# Patient Record
Sex: MALE | Race: BLACK OR AFRICAN AMERICAN | NOT HISPANIC OR LATINO | URBAN - METROPOLITAN AREA
[De-identification: names, ages, dates, MRNs, and addresses within clinical notes are randomized per-mention and may not be internally consistent; named-entity substitution may affect disease eponyms.]

---

## 2019-06-14 ENCOUNTER — OFFICE VISIT (OUTPATIENT)
Dept: CARDIOLOGY | Facility: CLINIC | Age: 74
End: 2019-06-14
Payer: MEDICARE

## 2019-06-14 VITALS
HEIGHT: 68 IN | DIASTOLIC BLOOD PRESSURE: 96 MMHG | WEIGHT: 166 LBS | HEART RATE: 90 BPM | BODY MASS INDEX: 25.16 KG/M2 | SYSTOLIC BLOOD PRESSURE: 165 MMHG

## 2019-06-14 DIAGNOSIS — I10 ESSENTIAL HYPERTENSION: ICD-10-CM

## 2019-06-14 DIAGNOSIS — R94.31 NONSPECIFIC ABNORMAL ELECTROCARDIOGRAM (ECG) (EKG): Primary | ICD-10-CM

## 2019-06-14 DIAGNOSIS — I73.9 PAD (PERIPHERAL ARTERY DISEASE): ICD-10-CM

## 2019-06-14 DIAGNOSIS — R94.31 NONSPECIFIC ABNORMAL ELECTROCARDIOGRAM (ECG) (EKG): ICD-10-CM

## 2019-06-14 DIAGNOSIS — I43 CARDIOMYOPATHY IN DISEASES CLASSIFIED ELSEWHERE: ICD-10-CM

## 2019-06-14 DIAGNOSIS — I42.6 ALCOHOLIC CARDIOMYOPATHY: Primary | ICD-10-CM

## 2019-06-14 DIAGNOSIS — E78.00 PURE HYPERCHOLESTEROLEMIA: ICD-10-CM

## 2019-06-14 DIAGNOSIS — I69.359 HEMIPARESIS DUE TO OLD STROKE: ICD-10-CM

## 2019-06-14 PROCEDURE — 99205 OFFICE O/P NEW HI 60 MIN: CPT | Mod: S$GLB,,, | Performed by: INTERNAL MEDICINE

## 2019-06-14 PROCEDURE — 99205 PR OFFICE/OUTPT VISIT, NEW, LEVL V, 60-74 MIN: ICD-10-PCS | Mod: S$GLB,,, | Performed by: INTERNAL MEDICINE

## 2019-06-14 RX ORDER — LISINOPRIL 10 MG/1
TABLET ORAL
Refills: 2 | COMMUNITY
Start: 2019-05-30 | End: 2019-06-14 | Stop reason: DRUGHIGH

## 2019-06-14 RX ORDER — LISINOPRIL 20 MG/1
20 TABLET ORAL DAILY
Qty: 90 TABLET | Refills: 3 | Status: SHIPPED | OUTPATIENT
Start: 2019-06-14 | End: 2019-07-19 | Stop reason: DRUGHIGH

## 2019-06-14 RX ORDER — ASPIRIN 81 MG/1
81 TABLET ORAL DAILY
COMMUNITY

## 2019-06-14 RX ORDER — LUBIPROSTONE 8 UG/1
CAPSULE, GELATIN COATED ORAL
Refills: 2 | COMMUNITY
Start: 2019-05-30

## 2019-06-14 RX ORDER — SIMVASTATIN 10 MG/1
TABLET, FILM COATED ORAL
Refills: 0 | COMMUNITY
Start: 2019-05-30 | End: 2020-03-04 | Stop reason: SDUPTHER

## 2019-06-14 RX ORDER — LACTULOSE 10 G/15ML
SOLUTION ORAL; RECTAL
Refills: 2 | COMMUNITY
Start: 2019-05-30

## 2019-06-14 NOTE — PROGRESS NOTES
Subjective:      Patient ID: Alec Teague is a 73 y.o. male.    Chief Complaint: No chief complaint on file.    HPI:    ROS     Past Medical History:   Diagnosis Date    Cardiomyopathy     attributed to alcohol 1980's    CHF (congestive heart failure)     Hyperlipidemia     Hypertension     Stroke     residual right hemiparesis. 2008        No past surgical history on file.    Family History   Problem Relation Age of Onset    Pancreatic cancer Mother     Cirrhosis Brother     Asthma Brother        Social History     Socioeconomic History    Marital status:      Spouse name: Not on file    Number of children: Not on file    Years of education: Not on file    Highest education level: Not on file   Occupational History    Not on file   Social Needs    Financial resource strain: Not on file    Food insecurity:     Worry: Not on file     Inability: Not on file    Transportation needs:     Medical: Not on file     Non-medical: Not on file   Tobacco Use    Smoking status: Current Every Day Smoker     Types: Pipe    Smokeless tobacco: Never Used   Substance and Sexual Activity    Alcohol use: Not Currently    Drug use: Not on file    Sexual activity: Not on file   Lifestyle    Physical activity:     Days per week: Not on file     Minutes per session: Not on file    Stress: Not on file   Relationships    Social connections:     Talks on phone: Not on file     Gets together: Not on file     Attends Nondenominational service: Not on file     Active member of club or organization: Not on file     Attends meetings of clubs or organizations: Not on file     Relationship status: Not on file   Other Topics Concern    Not on file   Social History Narrative    Not on file       Current Outpatient Medications on File Prior to Visit   Medication Sig Dispense Refill    AMITIZA 8 mcg Cap TK ONE C PO QD  2    aspirin (ECOTRIN) 81 MG EC tablet Take 81 mg by mouth once daily.      GENERLAC 10 gram/15 mL  solution TK  5 ML PO  BID  2    lisinopril (PRINIVIL,ZESTRIL) 20 MG tablet Take 1 tablet (20 mg total) by mouth once daily. 90 tablet 3    simvastatin (ZOCOR) 10 MG tablet TK 1 T PO QD  0    [DISCONTINUED] lisinopril 10 MG tablet TK 1 T PO BID  2     No current facility-administered medications on file prior to visit.        Review of patient's allergies indicates:   Allergen Reactions    Pcn [penicillins] Swelling     Objective:   There were no vitals filed for this visit.     Physical Exam     Assessment:     1. Nonspecific abnormal electrocardiogram (ECG) (EKG)    2. Cardiomyopathy in diseases classified elsewhere      Plan:   Diagnoses and all orders for this visit:    Nonspecific abnormal electrocardiogram (ECG) (EKG)  -     Treadmill Stress Test; Future    Cardiomyopathy in diseases classified elsewhere  -     NM Myocardial Perfusion Spect Multi Pharmacologic; Future         No follow-ups on file.

## 2019-06-14 NOTE — LETTER
June 14, 2019      Leny Gagnon MD  [address] [facility] [address]           [facility]  [address], [address]  [place] [address]  Phone: [phone]  Fax: [phone]          Patient: Alec Teague   MR Number: [id number]   YOB: 1945   Date of Visit: 6/14/2019       Dear Dr. Leny Gagnon:    Thank you for referring Alec Teague to me for evaluation. Attached you will find relevant portions of my assessment and plan of care.    If you have questions, please do not hesitate to call me. I look forward to following Alec Teague along with you.    Sincerely,    Austin Craig MD    Enclosure  CC:  No Recipients    If you would like to receive this communication electronically, please contact [email or web address] or [phone] to request more information on [employer] access.    For providers and/or their staff who would like to refer a patient to [facility], please contact us through our one-stop-shop provider referral line, [facility], at [phone].    If you feel you have received this communication in error or would no longer like to receive these types of communications, please e-mail [email or web address]

## 2019-06-14 NOTE — PROGRESS NOTES
Subjective:      Patient ID: Alec Teague is a 73 y.o. male.    Chief Complaint: Abnormal ECG (Ref by Dr Gagnon)    HPI:  Pt referred by Dr Leny Gagnon due to an abnormal ECG.  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  Pt quit drinking decades ago.  Pt is limited by right leg and right hip pain after walking 2 blocks.    Review of Systems   Cardiovascular: Negative for chest pain, claudication, dyspnea on exertion, irregular heartbeat, leg swelling, near-syncope, orthopnea, palpitations and syncope.      Pt works as a  and as a musician. Pt plays brotips.    Pt c/o chronic sinus problems  Pt uses nasal spray daily  Past Medical History:   Diagnosis Date    Cardiomyopathy     attributed to alcohol 1980's    CHF (congestive heart failure)     Hyperlipidemia     Hypertension     Stroke     residual right hemiparesis. 2008        History reviewed. No pertinent surgical history.    Family History   Problem Relation Age of Onset    Pancreatic cancer Mother     Cirrhosis Brother     Asthma Brother        Social History     Socioeconomic History    Marital status:      Spouse name: Not on file    Number of children: Not on file    Years of education: Not on file    Highest education level: Not on file   Occupational History    Not on file   Social Needs    Financial resource strain: Not on file    Food insecurity:     Worry: Not on file     Inability: Not on file    Transportation needs:     Medical: Not on file     Non-medical: Not on file   Tobacco Use    Smoking status: Current Every Day Smoker     Types: Pipe    Smokeless tobacco: Never Used   Substance and Sexual Activity    Alcohol use: Not Currently    Drug use: Not on file    Sexual activity: Not on file   Lifestyle    Physical activity:     Days per week: Not on file     Minutes per session: Not on file    Stress: Not on file   Relationships    Social connections:     Talks on phone: Not on file  "    Gets together: Not on file     Attends Sabianism service: Not on file     Active member of club or organization: Not on file     Attends meetings of clubs or organizations: Not on file     Relationship status: Not on file   Other Topics Concern    Not on file   Social History Narrative    Not on file       Current Outpatient Medications on File Prior to Visit   Medication Sig Dispense Refill    AMITIZA 8 mcg Cap TK ONE C PO QD  2    GENERLAC 10 gram/15 mL solution TK  5 ML PO  BID  2    simvastatin (ZOCOR) 10 MG tablet TK 1 T PO QD  0    [DISCONTINUED] lisinopril 10 MG tablet TK 1 T PO BID  2     No current facility-administered medications on file prior to visit.        Review of patient's allergies indicates:   Allergen Reactions    Pcn [penicillins] Swelling     Objective:     Vitals:    06/14/19 1405   BP: (!) 165/96   BP Location: Right arm   Patient Position: Sitting   BP Method: Medium (Automatic)   Pulse: 90   Weight: 75.3 kg (166 lb)   Height: 5' 8" (1.727 m)        Physical Exam   Constitutional: He is oriented to person, place, and time. He appears well-developed and well-nourished. No distress.   Eyes: No scleral icterus.   Neck: No JVD present. Carotid bruit is not present.   Cardiovascular: Regular rhythm and normal heart sounds.  Occasional extrasystoles are present. Exam reveals no gallop and no friction rub.   No murmur heard.  Pulses:       Dorsalis pedis pulses are 1+ on the right side, and 0 on the left side.        Posterior tibial pulses are 1+ on the right side, and 0 on the left side.   Both feet warm with skin intact   Pulmonary/Chest: Effort normal and breath sounds normal. No respiratory distress.   Abdominal: Soft. He exhibits no abdominal bruit, no pulsatile midline mass and no mass. There is no hepatosplenomegaly. There is no tenderness.   Musculoskeletal: He exhibits no edema.   Neurological: He is alert and oriented to person, place, and time.   Skin: Skin is warm and dry. " He is not diaphoretic.   Psychiatric: He has a normal mood and affect. His behavior is normal. Judgment and thought content normal.   Vitals reviewed.     ECG done at Dr Gagnon's office: NSR, LVH, left axis deviation, inverted T waves V5, V6  Assessment:     1. Alcoholic cardiomyopathy    2. Essential hypertension    3. Nonspecific abnormal electrocardiogram (ECG) (EKG)    4. Hemiparesis due to old stroke    5. Pure hypercholesterolemia    6. PAD (peripheral artery disease)      Plan:   Alec was seen today for abnormal ecg.    Diagnoses and all orders for this visit:    Alcoholic cardiomyopathy    Essential hypertension    Nonspecific abnormal electrocardiogram (ECG) (EKG)    Hemiparesis due to old stroke    Pure hypercholesterolemia    PAD (peripheral artery disease)    Other orders  -     lisinopril (PRINIVIL,ZESTRIL) 20 MG tablet; Take 1 tablet (20 mg total) by mouth once daily.     Will double dose of lisinopril due to elevated blood pressure  ECG abnormalities may represent repolarization abnormalities due to left ventricular hypertrophy  Echocardiogram with doppler f/u alcohol cardiomyopathy  Lexiscan Cardiolite stress test to screen for CAD given abnormal ECG  Smoking cessation counseled  RTC one month  Follow up in about 1 month (around 7/12/2019).

## 2019-07-17 ENCOUNTER — HOSPITAL ENCOUNTER (OUTPATIENT)
Dept: CARDIOLOGY | Facility: OTHER | Age: 74
Discharge: HOME | End: 2019-07-17
Attending: INTERNAL MEDICINE
Payer: MEDICARE

## 2019-07-17 ENCOUNTER — HOSPITAL ENCOUNTER (OUTPATIENT)
Dept: RADIOLOGY | Facility: OTHER | Age: 74
Discharge: HOME | End: 2019-07-17
Attending: INTERNAL MEDICINE
Payer: MEDICARE

## 2019-07-17 ENCOUNTER — TELEPHONE (OUTPATIENT)
Dept: CARDIOLOGY | Facility: CLINIC | Age: 74
End: 2019-07-17

## 2019-07-17 VITALS
BODY MASS INDEX: 25.16 KG/M2 | WEIGHT: 166 LBS | HEART RATE: 103 BPM | HEIGHT: 68 IN | SYSTOLIC BLOOD PRESSURE: 148 MMHG | DIASTOLIC BLOOD PRESSURE: 61 MMHG

## 2019-07-17 DIAGNOSIS — I42.6 ALCOHOLIC CARDIOMYOPATHY: ICD-10-CM

## 2019-07-17 DIAGNOSIS — R94.31 NONSPECIFIC ABNORMAL ELECTROCARDIOGRAM (ECG) (EKG): ICD-10-CM

## 2019-07-17 DIAGNOSIS — I43 CARDIOMYOPATHY IN DISEASES CLASSIFIED ELSEWHERE: ICD-10-CM

## 2019-07-17 LAB
AORTIC ROOT ANNULUS: 3.04 CM
AORTIC VALVE CUSP SEPERATION: 1.75 CM
AV INDEX (PROSTH): 1.12
AV MEAN GRADIENT: 1 MMHG
AV PEAK GRADIENT: 3 MMHG
AV VALVE AREA: 3.56 CM2
AV VELOCITY RATIO: 0.84
BSA FOR ECHO PROCEDURE: 1.9 M2
CV ECHO LV RWT: 0.4 CM
CV PHARM DOSE: 0.4 MG
CV STRESS BASE HR: 103 BPM
DIASTOLIC BLOOD PRESSURE: 61 MMHG
DOP CALC AO PEAK VEL: 0.87 M/S
DOP CALC AO VTI: 12.43 CM
DOP CALC LVOT AREA: 3.2 CM2
DOP CALC LVOT DIAMETER: 2.01 CM
DOP CALC LVOT PEAK VEL: 0.73 M/S
DOP CALC LVOT STROKE VOLUME: 44.31 CM3
DOP CALCLVOT PEAK VEL VTI: 13.97 CM
E WAVE DECELERATION TIME: 111.97 MSEC
E/A RATIO: 1
E/E' RATIO: 15.38 M/S
ECHO LV POSTERIOR WALL: 1.2 CM (ref 0.6–1.1)
FRACTIONAL SHORTENING: 18 % (ref 28–44)
INTERVENTRICULAR SEPTUM: 1.2 CM (ref 0.6–1.1)
LA MAJOR: 4.94 CM
LA MINOR: 4.87 CM
LA WIDTH: 3.43 CM
LEFT ATRIUM SIZE: 3.13 CM
LEFT ATRIUM VOLUME INDEX: 23.7 ML/M2
LEFT ATRIUM VOLUME: 44.76 CM3
LEFT INTERNAL DIMENSION IN SYSTOLE: 4.99 CM (ref 2.1–4)
LEFT VENTRICLE DIASTOLIC VOLUME INDEX: 97.78 ML/M2
LEFT VENTRICLE DIASTOLIC VOLUME: 184.64 ML
LEFT VENTRICLE MASS INDEX: 170 G/M2
LEFT VENTRICLE SYSTOLIC VOLUME INDEX: 62.3 ML/M2
LEFT VENTRICLE SYSTOLIC VOLUME: 117.64 ML
LEFT VENTRICULAR INTERNAL DIMENSION IN DIASTOLE: 6.07 CM (ref 3.5–6)
LEFT VENTRICULAR MASS: 320.09 G
LV LATERAL E/E' RATIO: 16.67 M/S
LV SEPTAL E/E' RATIO: 14.29 M/S
MV PEAK A VEL: 1 M/S
MV PEAK E VEL: 1 M/S
OHS CV CPX 1 MINUTE RECOVERY HEART RATE: 108 BPM
OHS CV CPX 85 PERCENT MAX PREDICTED HEART RATE MALE: 126
OHS CV CPX MAX PREDICTED HEART RATE: 148
OHS CV CPX PATIENT IS FEMALE: 0
OHS CV CPX PATIENT IS MALE: 1
OHS CV CPX PEAK DIASTOLIC BLOOD PRESSURE: 84 MMHG
OHS CV CPX PEAK HEAR RATE: 110 BPM
OHS CV CPX PEAK RATE PRESSURE PRODUCT: NORMAL
OHS CV CPX PEAK SYSTOLIC BLOOD PRESSURE: 136 MMHG
OHS CV CPX PERCENT MAX PREDICTED HEART RATE ACHIEVED: 74
OHS CV CPX RATE PRESSURE PRODUCT PRESENTING: NORMAL
PV PEAK VELOCITY: 0.7 CM/S
RA MAJOR: 4.46 CM
RA PRESSURE: 8 MMHG
RA WIDTH: 2.85 CM
SINUS: 3.4 CM
STJ: 2.77 CM
STRESS ECHO TARGET HR: 125.8 BPM
SYSTOLIC BLOOD PRESSURE: 148 MMHG
TDI LATERAL: 0.06 M/S
TDI SEPTAL: 0.07 M/S
TDI: 0.07 M/S

## 2019-07-17 PROCEDURE — 93016 TREADMILL STRESS TEST (CUPID ONLY): ICD-10-PCS | Mod: ,,, | Performed by: INTERNAL MEDICINE

## 2019-07-17 PROCEDURE — 78452 HT MUSCLE IMAGE SPECT MULT: CPT | Mod: 26,,, | Performed by: RADIOLOGY

## 2019-07-17 PROCEDURE — 93306 TTE W/DOPPLER COMPLETE: CPT

## 2019-07-17 PROCEDURE — A9502 TC99M TETROFOSMIN: HCPCS

## 2019-07-17 PROCEDURE — 93016 CV STRESS TEST SUPVJ ONLY: CPT | Mod: ,,, | Performed by: INTERNAL MEDICINE

## 2019-07-17 PROCEDURE — 93018 TREADMILL STRESS TEST (CUPID ONLY): ICD-10-PCS | Mod: ,,, | Performed by: INTERNAL MEDICINE

## 2019-07-17 PROCEDURE — 93306 TTE W/DOPPLER COMPLETE: CPT | Mod: 26,,, | Performed by: INTERNAL MEDICINE

## 2019-07-17 PROCEDURE — 78452 NM MYOCARDIAL PERFUSION SPECT MULTI PHARM: ICD-10-PCS | Mod: 26,,, | Performed by: RADIOLOGY

## 2019-07-17 PROCEDURE — 93018 CV STRESS TEST I&R ONLY: CPT | Mod: ,,, | Performed by: INTERNAL MEDICINE

## 2019-07-17 PROCEDURE — 93017 CV STRESS TEST TRACING ONLY: CPT

## 2019-07-17 PROCEDURE — 93306 TRANSTHORACIC ECHO (TTE) COMPLETE (CUPID ONLY): ICD-10-PCS | Mod: 26,,, | Performed by: INTERNAL MEDICINE

## 2019-07-17 NOTE — TELEPHONE ENCOUNTER
Echocardiogram shows dilated LV with severely depressed LVEF 20%  Cardiolite stress test shows possible inferior scar, no ischemia and LVEF 17%    Pt reminded of the importance of abstinence from alcohol.  Pt has appt Friday to make sure his blood pressure is ideally controlled.    If LVEF remains below 35 % on a repeat echo in 3 months will then recommend AICD.  Discussed with pt.

## 2019-07-19 ENCOUNTER — OFFICE VISIT (OUTPATIENT)
Dept: CARDIOLOGY | Facility: CLINIC | Age: 74
End: 2019-07-19
Payer: MEDICARE

## 2019-07-19 VITALS
SYSTOLIC BLOOD PRESSURE: 154 MMHG | HEIGHT: 68 IN | WEIGHT: 165 LBS | DIASTOLIC BLOOD PRESSURE: 88 MMHG | HEART RATE: 109 BPM | BODY MASS INDEX: 25.01 KG/M2

## 2019-07-19 DIAGNOSIS — I73.9 PAD (PERIPHERAL ARTERY DISEASE): ICD-10-CM

## 2019-07-19 DIAGNOSIS — I42.8 NON-ISCHEMIC CARDIOMYOPATHY: ICD-10-CM

## 2019-07-19 DIAGNOSIS — I10 ESSENTIAL HYPERTENSION: Primary | Chronic | ICD-10-CM

## 2019-07-19 DIAGNOSIS — E78.00 PURE HYPERCHOLESTEROLEMIA: ICD-10-CM

## 2019-07-19 DIAGNOSIS — I69.359 HEMIPARESIS DUE TO OLD STROKE: ICD-10-CM

## 2019-07-19 DIAGNOSIS — R94.31 NONSPECIFIC ABNORMAL ELECTROCARDIOGRAM (ECG) (EKG): ICD-10-CM

## 2019-07-19 PROCEDURE — 99214 OFFICE O/P EST MOD 30 MIN: CPT | Mod: S$GLB,,, | Performed by: INTERNAL MEDICINE

## 2019-07-19 PROCEDURE — 99214 PR OFFICE/OUTPT VISIT, EST, LEVL IV, 30-39 MIN: ICD-10-PCS | Mod: S$GLB,,, | Performed by: INTERNAL MEDICINE

## 2019-07-19 RX ORDER — CARVEDILOL 3.12 MG/1
3.12 TABLET ORAL 2 TIMES DAILY WITH MEALS
Qty: 60 TABLET | Refills: 11 | Status: SHIPPED | OUTPATIENT
Start: 2019-07-19 | End: 2019-11-22 | Stop reason: DRUGHIGH

## 2019-07-19 RX ORDER — LISINOPRIL 20 MG/1
20 TABLET ORAL 2 TIMES DAILY
Qty: 180 TABLET | Refills: 3 | Status: SHIPPED | OUTPATIENT
Start: 2019-07-19 | End: 2019-11-22

## 2019-07-19 NOTE — PROGRESS NOTES
"  Subjective:      Patient ID: Alec Teague is a 74 y.o. male.    Chief Complaint: Follow-up (Blood pressure check )    HPI:  "I feel pretty good."  Pt states he does not consume any alcohol.    Pt states he felt woozy after taking lisinopril.    Review of Systems   Cardiovascular: Negative for chest pain, claudication, dyspnea on exertion, irregular heartbeat, leg swelling, near-syncope, orthopnea, palpitations and syncope.        Past Medical History:   Diagnosis Date    Cardiomyopathy     attributed to alcohol 1980's    CHF (congestive heart failure)     Hyperlipidemia     Hypertension     Stroke     residual right hemiparesis. 2008        No past surgical history on file.    Family History   Problem Relation Age of Onset    Pancreatic cancer Mother     Cirrhosis Brother     Asthma Brother        Social History     Socioeconomic History    Marital status:      Spouse name: Not on file    Number of children: Not on file    Years of education: Not on file    Highest education level: Not on file   Occupational History    Not on file   Social Needs    Financial resource strain: Not on file    Food insecurity:     Worry: Not on file     Inability: Not on file    Transportation needs:     Medical: Not on file     Non-medical: Not on file   Tobacco Use    Smoking status: Current Every Day Smoker     Types: Pipe    Smokeless tobacco: Never Used   Substance and Sexual Activity    Alcohol use: Not Currently    Drug use: Not on file    Sexual activity: Not on file   Lifestyle    Physical activity:     Days per week: Not on file     Minutes per session: Not on file    Stress: Not on file   Relationships    Social connections:     Talks on phone: Not on file     Gets together: Not on file     Attends Christianity service: Not on file     Active member of club or organization: Not on file     Attends meetings of clubs or organizations: Not on file     Relationship status: Not on file   Other Topics " "Concern    Not on file   Social History Narrative    Not on file       Current Outpatient Medications on File Prior to Visit   Medication Sig Dispense Refill    AMITIZA 8 mcg Cap TK ONE C PO QD  2    aspirin (ECOTRIN) 81 MG EC tablet Take 81 mg by mouth once daily.      GENERLAC 10 gram/15 mL solution TK  5 ML PO  BID  2    simvastatin (ZOCOR) 10 MG tablet TK 1 T PO QD  0    [DISCONTINUED] lisinopril (PRINIVIL,ZESTRIL) 20 MG tablet Take 1 tablet (20 mg total) by mouth once daily. 90 tablet 3     No current facility-administered medications on file prior to visit.        Review of patient's allergies indicates:   Allergen Reactions    Pcn [penicillins] Swelling     Objective:     Vitals:    07/19/19 1413 07/19/19 1417   BP: (!) 145/86 (!) 154/88   BP Location: Right arm    Patient Position: Sitting    BP Method: Medium (Automatic)    Pulse: (!) 112 109   Weight: 74.8 kg (165 lb)    Height: 5' 8" (1.727 m)         Physical Exam   Constitutional: He is oriented to person, place, and time. He appears well-developed and well-nourished. No distress.   Eyes: No scleral icterus.   Neck: No JVD present. Carotid bruit is not present.   Cardiovascular: Regular rhythm and normal heart sounds. Exam reveals no gallop and no friction rub.   No murmur heard.  Pulmonary/Chest: Effort normal and breath sounds normal. No respiratory distress.   Musculoskeletal: He exhibits no edema.   Neurological: He is alert and oriented to person, place, and time.   Skin: Skin is warm and dry. He is not diaphoretic.   Psychiatric: He has a normal mood and affect. His behavior is normal. Judgment and thought content normal.   Vitals reviewed.     Echo: LVH, severely decreased LVEF 20%, moderate mitral regurgitation    Cardiolite stress : LVEF 17%; no ischemia, no infarct    Assessment:     1. Essential hypertension    2. Non-ischemic cardiomyopathy    3. Pure hypercholesterolemia    4. PAD (peripheral artery disease)    5. Hemiparesis due to " old stroke    6. Nonspecific abnormal electrocardiogram (ECG) (EKG)      Plan:   Alec was seen today for follow-up.    Diagnoses and all orders for this visit:    Essential hypertension    Non-ischemic cardiomyopathy    Pure hypercholesterolemia    PAD (peripheral artery disease)    Hemiparesis due to old stroke    Nonspecific abnormal electrocardiogram (ECG) (EKG)    Other orders  -     carvedilol (COREG) 3.125 MG tablet; Take 1 tablet (3.125 mg total) by mouth 2 (two) times daily with meals.  -     lisinopril (PRINIVIL,ZESTRIL) 20 MG tablet; Take 1 tablet (20 mg total) by mouth 2 (two) times daily.     Pt reminded not to consume alcohol.  Pt states he has had nothing to drink since 1984    Increase the lisinopril to 20 mg bid--pt is reluctant to increase the lisinopril.     Begin carvedilol 3.125 mg bid    Anticipate repeat echo in 3 months.  If LVEF is still below 35% will recommend AICD.  Discussed at length and in detail with pt. Pt states he does not want an AICD anyway.    RTC one month.  Will try to up-titrate dose of carvedilol next visit.    Follow up in about 4 weeks (around 8/16/2019).

## 2019-08-21 ENCOUNTER — OFFICE VISIT (OUTPATIENT)
Dept: CARDIOLOGY | Facility: CLINIC | Age: 74
End: 2019-08-21
Payer: MEDICARE

## 2019-08-21 VITALS
BODY MASS INDEX: 25.01 KG/M2 | DIASTOLIC BLOOD PRESSURE: 81 MMHG | HEIGHT: 68 IN | SYSTOLIC BLOOD PRESSURE: 138 MMHG | WEIGHT: 165 LBS | HEART RATE: 99 BPM

## 2019-08-21 DIAGNOSIS — I73.9 PAD (PERIPHERAL ARTERY DISEASE): ICD-10-CM

## 2019-08-21 DIAGNOSIS — I69.359 HEMIPARESIS DUE TO OLD STROKE: ICD-10-CM

## 2019-08-21 DIAGNOSIS — R94.31 NONSPECIFIC ABNORMAL ELECTROCARDIOGRAM (ECG) (EKG): ICD-10-CM

## 2019-08-21 DIAGNOSIS — I42.8 NON-ISCHEMIC CARDIOMYOPATHY: ICD-10-CM

## 2019-08-21 DIAGNOSIS — E78.00 PURE HYPERCHOLESTEROLEMIA: ICD-10-CM

## 2019-08-21 DIAGNOSIS — I42.6 ALCOHOLIC CARDIOMYOPATHY: ICD-10-CM

## 2019-08-21 DIAGNOSIS — I10 ESSENTIAL HYPERTENSION: Primary | Chronic | ICD-10-CM

## 2019-08-21 PROCEDURE — 99214 PR OFFICE/OUTPT VISIT, EST, LEVL IV, 30-39 MIN: ICD-10-PCS | Mod: S$GLB,,, | Performed by: INTERNAL MEDICINE

## 2019-08-21 PROCEDURE — 99214 OFFICE O/P EST MOD 30 MIN: CPT | Mod: S$GLB,,, | Performed by: INTERNAL MEDICINE

## 2019-08-21 NOTE — PROGRESS NOTES
Subjective:      Patient ID: Alec Teague is a 74 y.o. male.    Chief Complaint: Hypertension (Blood pressure follow up - - feels lightheaded sometimes since increase of Lisinopril.)    HPI:  Pt feels tired on the lisinopril.    Review of Systems   Cardiovascular: Negative for chest pain, claudication, dyspnea on exertion, irregular heartbeat, leg swelling, near-syncope, orthopnea, palpitations and syncope.      Pt states he no longer drinks vodka    Smokes tobacco pipe.  Pt counseled to quit.    No alcohol since 1985    Past Medical History:   Diagnosis Date    Cardiomyopathy     attributed to alcohol 1980's    CHF (congestive heart failure)     Hyperlipidemia     Hypertension     Stroke     residual right hemiparesis. 2008        No past surgical history on file.    Family History   Problem Relation Age of Onset    Pancreatic cancer Mother     Cirrhosis Brother     Asthma Brother        Social History     Socioeconomic History    Marital status:      Spouse name: Not on file    Number of children: Not on file    Years of education: Not on file    Highest education level: Not on file   Occupational History    Not on file   Social Needs    Financial resource strain: Not on file    Food insecurity:     Worry: Not on file     Inability: Not on file    Transportation needs:     Medical: Not on file     Non-medical: Not on file   Tobacco Use    Smoking status: Current Every Day Smoker     Types: Pipe    Smokeless tobacco: Never Used   Substance and Sexual Activity    Alcohol use: Not Currently    Drug use: Not on file    Sexual activity: Not on file   Lifestyle    Physical activity:     Days per week: Not on file     Minutes per session: Not on file    Stress: Not on file   Relationships    Social connections:     Talks on phone: Not on file     Gets together: Not on file     Attends Oriental orthodox service: Not on file     Active member of club or organization: Not on file     Attends  "meetings of clubs or organizations: Not on file     Relationship status: Not on file   Other Topics Concern    Not on file   Social History Narrative    Not on file       Current Outpatient Medications on File Prior to Visit   Medication Sig Dispense Refill    AMITIZA 8 mcg Cap TK ONE C PO QD  2    aspirin (ECOTRIN) 81 MG EC tablet Take 81 mg by mouth once daily.      carvedilol (COREG) 3.125 MG tablet Take 1 tablet (3.125 mg total) by mouth 2 (two) times daily with meals. 60 tablet 11    GENERLAC 10 gram/15 mL solution TK  5 ML PO  BID  2    lisinopril (PRINIVIL,ZESTRIL) 20 MG tablet Take 1 tablet (20 mg total) by mouth 2 (two) times daily. 180 tablet 3    simvastatin (ZOCOR) 10 MG tablet TK 1 T PO QD  0     No current facility-administered medications on file prior to visit.        Review of patient's allergies indicates:   Allergen Reactions    Pcn [penicillins] Swelling     Objective:     Vitals:    08/21/19 1409   BP: 138/81   BP Location: Left arm   Patient Position: Sitting   BP Method: Large (Automatic)   Pulse: 99   Weight: 74.8 kg (165 lb)   Height: 5' 8" (1.727 m)        Physical Exam   Constitutional: He is oriented to person, place, and time. He appears well-developed and well-nourished. No distress.   Eyes: No scleral icterus.   Neck: No JVD present. Carotid bruit is not present.   Cardiovascular: Regular rhythm and normal heart sounds. Exam reveals no gallop and no friction rub.   No murmur heard.  Pulmonary/Chest: Effort normal. No respiratory distress. He has wheezes.   Musculoskeletal: He exhibits no edema.   Neurological: He is alert and oriented to person, place, and time.   Skin: Skin is warm and dry. He is not diaphoretic.   Psychiatric: He has a normal mood and affect. His behavior is normal. Judgment and thought content normal.   Vitals reviewed.       Assessment:     1. Essential hypertension    2. Non-ischemic cardiomyopathy    3. Alcoholic cardiomyopathy    4. Hemiparesis due to " old stroke    5. Nonspecific abnormal electrocardiogram (ECG) (EKG)    6. PAD (peripheral artery disease)    7. Pure hypercholesterolemia      Plan:   Alec was seen today for hypertension.    Diagnoses and all orders for this visit:    Essential hypertension    Non-ischemic cardiomyopathy    Alcoholic cardiomyopathy    Hemiparesis due to old stroke    Nonspecific abnormal electrocardiogram (ECG) (EKG)    PAD (peripheral artery disease)    Pure hypercholesterolemia     Same meds    RTC 3 months with repeat echocardiogram.  If LVEF is still low will recommend an AICD.  Pt states he will not consent to an AICD even if it is recommended.    No follow-ups on file.

## 2019-11-15 ENCOUNTER — TELEPHONE (OUTPATIENT)
Dept: CARDIOLOGY | Facility: CLINIC | Age: 74
End: 2019-11-15

## 2019-11-15 ENCOUNTER — HOSPITAL ENCOUNTER (OUTPATIENT)
Dept: CARDIOLOGY | Facility: OTHER | Age: 74
Discharge: HOME | End: 2019-11-15
Attending: INTERNAL MEDICINE
Payer: MEDICARE

## 2019-11-15 VITALS
DIASTOLIC BLOOD PRESSURE: 81 MMHG | WEIGHT: 165 LBS | SYSTOLIC BLOOD PRESSURE: 138 MMHG | HEIGHT: 68 IN | BODY MASS INDEX: 25.01 KG/M2

## 2019-11-15 DIAGNOSIS — I42.8 NON-ISCHEMIC CARDIOMYOPATHY: ICD-10-CM

## 2019-11-15 LAB
AORTIC ROOT ANNULUS: 2.93 CM
AORTIC VALVE CUSP SEPERATION: 1.76 CM
ASCENDING AORTA: 3.24 CM
AV INDEX (PROSTH): 1.03
AV MEAN GRADIENT: 3 MMHG
AV PEAK GRADIENT: 5 MMHG
AV VALVE AREA: 3.1 CM2
AV VELOCITY RATIO: 0.83
BSA FOR ECHO PROCEDURE: 1.89 M2
CV ECHO LV RWT: 0.3 CM
DOP CALC AO PEAK VEL: 1.13 M/S
DOP CALC AO VTI: 17.59 CM
DOP CALC LVOT AREA: 3 CM2
DOP CALC LVOT DIAMETER: 1.96 CM
DOP CALC LVOT PEAK VEL: 0.94 M/S
DOP CALC LVOT STROKE VOLUME: 54.49 CM3
DOP CALCLVOT PEAK VEL VTI: 18.07 CM
E WAVE DECELERATION TIME: 120.52 MSEC
E/A RATIO: 1.37
E/E' RATIO: 16.67 M/S
ECHO LV POSTERIOR WALL: 1 CM (ref 0.6–1.1)
FRACTIONAL SHORTENING: 13 % (ref 28–44)
INTERVENTRICULAR SEPTUM: 1 CM (ref 0.6–1.1)
LA MAJOR: 5.83 CM
LA MINOR: 5.2 CM
LA WIDTH: 3.67 CM
LEFT ATRIUM SIZE: 4.2 CM
LEFT ATRIUM VOLUME INDEX: 38.2 ML/M2
LEFT ATRIUM VOLUME: 72.02 CM3
LEFT INTERNAL DIMENSION IN SYSTOLE: 5.87 CM (ref 2.1–4)
LEFT VENTRICLE DIASTOLIC VOLUME INDEX: 123.33 ML/M2
LEFT VENTRICLE DIASTOLIC VOLUME: 232.29 ML
LEFT VENTRICLE MASS INDEX: 159 G/M2
LEFT VENTRICLE SYSTOLIC VOLUME INDEX: 91 ML/M2
LEFT VENTRICLE SYSTOLIC VOLUME: 171.47 ML
LEFT VENTRICULAR INTERNAL DIMENSION IN DIASTOLE: 6.71 CM (ref 3.5–6)
LEFT VENTRICULAR MASS: 299.01 G
LV LATERAL E/E' RATIO: 14.29 M/S
LV SEPTAL E/E' RATIO: 20 M/S
MV PEAK A VEL: 0.73 M/S
MV PEAK E VEL: 1 M/S
RA MAJOR: 4.89 CM
RA PRESSURE: 8 MMHG
RA WIDTH: 2.5 CM
SINUS: 2.78 CM
STJ: 3.13 CM
TDI LATERAL: 0.07 M/S
TDI SEPTAL: 0.05 M/S
TDI: 0.06 M/S
TRICUSPID ANNULAR PLANE SYSTOLIC EXCURSION: 2.68 CM

## 2019-11-15 PROCEDURE — 93306 TTE W/DOPPLER COMPLETE: CPT

## 2019-11-15 PROCEDURE — 93306 TTE W/DOPPLER COMPLETE: CPT | Mod: 26,,, | Performed by: INTERNAL MEDICINE

## 2019-11-15 PROCEDURE — 93306 TRANSTHORACIC ECHO (TTE) COMPLETE (CUPID ONLY): ICD-10-PCS | Mod: 26,,, | Performed by: INTERNAL MEDICINE

## 2019-11-15 NOTE — TELEPHONE ENCOUNTER
I spoke with pt:  LVEF remains quite low at 20-25%  The MR appears to have worsened from moderate to severe.  Pt advised to consider elective AICD placement but declines  If pt develops shortness of breath would consider mitral clip.  Pt reminded not to consume alcohol  Pt says is BP is in th 120's systolic at home.  Pt has an appt in 7 d with me

## 2019-11-22 ENCOUNTER — OFFICE VISIT (OUTPATIENT)
Dept: CARDIOLOGY | Facility: CLINIC | Age: 74
End: 2019-11-22
Payer: MEDICARE

## 2019-11-22 VITALS
WEIGHT: 162 LBS | HEIGHT: 68 IN | BODY MASS INDEX: 24.55 KG/M2 | DIASTOLIC BLOOD PRESSURE: 80 MMHG | SYSTOLIC BLOOD PRESSURE: 130 MMHG | HEART RATE: 106 BPM

## 2019-11-22 DIAGNOSIS — E78.00 PURE HYPERCHOLESTEROLEMIA: ICD-10-CM

## 2019-11-22 DIAGNOSIS — R94.31 NONSPECIFIC ABNORMAL ELECTROCARDIOGRAM (ECG) (EKG): ICD-10-CM

## 2019-11-22 DIAGNOSIS — I69.359 HEMIPARESIS DUE TO OLD STROKE: ICD-10-CM

## 2019-11-22 DIAGNOSIS — I42.8 NON-ISCHEMIC CARDIOMYOPATHY: Primary | ICD-10-CM

## 2019-11-22 DIAGNOSIS — I73.9 PAD (PERIPHERAL ARTERY DISEASE): ICD-10-CM

## 2019-11-22 DIAGNOSIS — I10 ESSENTIAL HYPERTENSION: Chronic | ICD-10-CM

## 2019-11-22 DIAGNOSIS — I42.8 NONISCHEMIC CARDIOMYOPATHY: ICD-10-CM

## 2019-11-22 DIAGNOSIS — I42.6 ALCOHOLIC CARDIOMYOPATHY: ICD-10-CM

## 2019-11-22 DIAGNOSIS — I34.0 NONRHEUMATIC MITRAL VALVE REGURGITATION: ICD-10-CM

## 2019-11-22 PROCEDURE — 93000 ELECTROCARDIOGRAM COMPLETE: CPT | Mod: S$GLB,,, | Performed by: INTERNAL MEDICINE

## 2019-11-22 PROCEDURE — 99214 PR OFFICE/OUTPT VISIT, EST, LEVL IV, 30-39 MIN: ICD-10-PCS | Mod: S$GLB,,, | Performed by: INTERNAL MEDICINE

## 2019-11-22 PROCEDURE — 1159F PR MEDICATION LIST DOCUMENTED IN MEDICAL RECORD: ICD-10-PCS | Mod: S$GLB,,, | Performed by: INTERNAL MEDICINE

## 2019-11-22 PROCEDURE — 1159F MED LIST DOCD IN RCRD: CPT | Mod: S$GLB,,, | Performed by: INTERNAL MEDICINE

## 2019-11-22 PROCEDURE — 99214 OFFICE O/P EST MOD 30 MIN: CPT | Mod: S$GLB,,, | Performed by: INTERNAL MEDICINE

## 2019-11-22 PROCEDURE — 93000 EKG 12-LEAD: ICD-10-PCS | Mod: S$GLB,,, | Performed by: INTERNAL MEDICINE

## 2019-11-22 RX ORDER — LISINOPRIL 10 MG/1
TABLET ORAL
Refills: 0 | COMMUNITY
Start: 2019-08-29

## 2019-11-22 RX ORDER — CARVEDILOL 6.25 MG/1
6.25 TABLET ORAL EVERY 12 HOURS
Qty: 60 TABLET | Refills: 11 | Status: SHIPPED | OUTPATIENT
Start: 2019-11-22 | End: 2020-03-04 | Stop reason: DRUGHIGH

## 2019-11-22 NOTE — PROGRESS NOTES
Subjective:      Patient ID: Alec Teague is a 74 y.o. male.    Chief Complaint: Follow-up (Hypertension)    HPI:  Pt feels OK.  Pt monitors blood pressure at home.    Walked blocks yesterday without difficulty    Mops and sweeps at home without difficulty    Review of Systems   Cardiovascular: Negative for chest pain, claudication, dyspnea on exertion, irregular heartbeat, leg swelling, near-syncope, orthopnea, palpitations and syncope.       Pt drank eggnog last week.  Pt states he no longer consumes alcohol.    Past Medical History:   Diagnosis Date    Cardiomyopathy     attributed to alcohol 1980's    CHF (congestive heart failure)     Hyperlipidemia     Hypertension     Stroke     residual right hemiparesis. 2008        No past surgical history on file.    Family History   Problem Relation Age of Onset    Pancreatic cancer Mother     Cirrhosis Brother     Asthma Brother        Social History     Socioeconomic History    Marital status:      Spouse name: Not on file    Number of children: Not on file    Years of education: Not on file    Highest education level: Not on file   Occupational History    Not on file   Social Needs    Financial resource strain: Not on file    Food insecurity:     Worry: Not on file     Inability: Not on file    Transportation needs:     Medical: Not on file     Non-medical: Not on file   Tobacco Use    Smoking status: Current Every Day Smoker     Types: Pipe    Smokeless tobacco: Never Used   Substance and Sexual Activity    Alcohol use: Not Currently    Drug use: Not on file    Sexual activity: Not on file   Lifestyle    Physical activity:     Days per week: Not on file     Minutes per session: Not on file    Stress: Not on file   Relationships    Social connections:     Talks on phone: Not on file     Gets together: Not on file     Attends Mosque service: Not on file     Active member of club or organization: Not on file     Attends meetings of  "clubs or organizations: Not on file     Relationship status: Not on file   Other Topics Concern    Not on file   Social History Narrative    Not on file       Current Outpatient Medications on File Prior to Visit   Medication Sig Dispense Refill    AMITIZA 8 mcg Cap TK ONE C PO QD  2    aspirin (ECOTRIN) 81 MG EC tablet Take 81 mg by mouth once daily.      docosahexanoic acid/epa (FISH OIL ORAL) Take by mouth once daily.      FOLIC ACID ORAL Take by mouth once daily.      GENERLAC 10 gram/15 mL solution TK  5 ML PO  BID  2    lisinopril 10 MG tablet TK 1 T PO BID  0    simvastatin (ZOCOR) 10 MG tablet TK 1 T PO QD  0    [DISCONTINUED] carvedilol (COREG) 3.125 MG tablet Take 1 tablet (3.125 mg total) by mouth 2 (two) times daily with meals. 60 tablet 11    [DISCONTINUED] lisinopril (PRINIVIL,ZESTRIL) 20 MG tablet Take 1 tablet (20 mg total) by mouth 2 (two) times daily. (Patient taking differently: Take 10 mg by mouth 2 (two) times daily. ) 180 tablet 3     No current facility-administered medications on file prior to visit.        Review of patient's allergies indicates:   Allergen Reactions    Pcn [penicillins] Swelling     Objective:     Vitals:    11/22/19 1430 11/22/19 1435 11/22/19 1526   BP: 133/82 (!) 145/85 130/80   BP Location: Right arm Left arm Right arm   Patient Position: Sitting Sitting Sitting   BP Method: Large (Automatic) Large (Automatic)    Pulse: 106 106    Weight: 73.5 kg (162 lb)     Height: 5' 8" (1.727 m)          Physical Exam   Constitutional: He is oriented to person, place, and time. He appears well-developed and well-nourished. No distress.   Eyes: No scleral icterus.   Neck: No JVD present. Carotid bruit is not present.   Cardiovascular: Regular rhythm and normal heart sounds.  Occasional extrasystoles are present. PMI is displaced. Exam reveals no gallop and no friction rub.   No murmur heard.  Pulmonary/Chest: Effort normal. No respiratory distress. He has rales in the " left lower field.   Musculoskeletal: He exhibits no edema.   Neurological: He is alert and oriented to person, place, and time.   Skin: Skin is warm and dry. He is not diaphoretic.   Psychiatric: He has a normal mood and affect. His behavior is normal. Judgment and thought content normal.   Vitals reviewed.     Wt down 4 lbs    ECG: NSR with PVC's,  LVH with strain    Note Cardiolite stress test earlier this year showed LVEF 17% and possible inferior scar    Recent echocardiogram showed LVEF 25% and  severe mitral regurgitation.      Assessment:     1. Non-ischemic cardiomyopathy    2. Alcoholic cardiomyopathy    3. Essential hypertension    4. Nonspecific abnormal electrocardiogram (ECG) (EKG)    5. Hemiparesis due to old stroke    6. PAD (peripheral artery disease)    7. Pure hypercholesterolemia    8. Nonrheumatic mitral valve regurgitation    9. Nonischemic cardiomyopathy      Plan:   Alec was seen today for follow-up.    Diagnoses and all orders for this visit:    Non-ischemic cardiomyopathy  -     IN OFFICE EKG 12-LEAD (to [facility])    Alcoholic cardiomyopathy    Essential hypertension    Nonspecific abnormal electrocardiogram (ECG) (EKG)    Hemiparesis due to old stroke    PAD (peripheral artery disease)    Pure hypercholesterolemia    Nonrheumatic mitral valve regurgitation    Nonischemic cardiomyopathy  -     IN OFFICE EKG 12-LEAD (to [place])    Other orders  -     carvedilol (COREG) 6.25 MG tablet; Take 1 tablet (6.25 mg total) by mouth every 12 (twelve) hours.     Will increase the carvedilol to 6.25 mg bid    Rest of meds the same    Pt states he was intolerant to higher doses of lisinopril    Pt understands the importance of cessation of alcohol    Pt declines evaluation for mitral clip    Pt declines AICD even though I have explained the benefit    RTC 3 months    F/u with Dr Gagnon who does labs    Follow up in about 3 months (around 2/22/2020).

## 2020-03-04 ENCOUNTER — OFFICE VISIT (OUTPATIENT)
Dept: CARDIOLOGY | Facility: CLINIC | Age: 75
End: 2020-03-04
Payer: MEDICARE

## 2020-03-04 VITALS
HEIGHT: 68 IN | SYSTOLIC BLOOD PRESSURE: 137 MMHG | HEART RATE: 106 BPM | DIASTOLIC BLOOD PRESSURE: 89 MMHG | BODY MASS INDEX: 23.64 KG/M2 | WEIGHT: 156 LBS

## 2020-03-04 DIAGNOSIS — I42.6 ALCOHOLIC CARDIOMYOPATHY: ICD-10-CM

## 2020-03-04 DIAGNOSIS — E78.00 PURE HYPERCHOLESTEROLEMIA: ICD-10-CM

## 2020-03-04 DIAGNOSIS — I10 ESSENTIAL HYPERTENSION: Chronic | ICD-10-CM

## 2020-03-04 DIAGNOSIS — I42.8 NON-ISCHEMIC CARDIOMYOPATHY: Primary | ICD-10-CM

## 2020-03-04 DIAGNOSIS — R00.0 SINUS TACHYCARDIA: ICD-10-CM

## 2020-03-04 DIAGNOSIS — R94.31 NONSPECIFIC ABNORMAL ELECTROCARDIOGRAM (ECG) (EKG): ICD-10-CM

## 2020-03-04 DIAGNOSIS — Z91.199 NONCOMPLIANCE: ICD-10-CM

## 2020-03-04 DIAGNOSIS — I34.0 NONRHEUMATIC MITRAL VALVE REGURGITATION: ICD-10-CM

## 2020-03-04 DIAGNOSIS — I42.9 CARDIOMYOPATHY: ICD-10-CM

## 2020-03-04 DIAGNOSIS — I69.359 HEMIPARESIS DUE TO OLD STROKE: ICD-10-CM

## 2020-03-04 DIAGNOSIS — I73.9 PAD (PERIPHERAL ARTERY DISEASE): ICD-10-CM

## 2020-03-04 PROCEDURE — 93000 ELECTROCARDIOGRAM COMPLETE: CPT | Mod: S$GLB,,, | Performed by: INTERNAL MEDICINE

## 2020-03-04 PROCEDURE — 99214 OFFICE O/P EST MOD 30 MIN: CPT | Mod: S$GLB,,, | Performed by: INTERNAL MEDICINE

## 2020-03-04 PROCEDURE — 93000 EKG 12-LEAD: ICD-10-PCS | Mod: S$GLB,,, | Performed by: INTERNAL MEDICINE

## 2020-03-04 PROCEDURE — 99214 PR OFFICE/OUTPT VISIT, EST, LEVL IV, 30-39 MIN: ICD-10-PCS | Mod: S$GLB,,, | Performed by: INTERNAL MEDICINE

## 2020-03-04 RX ORDER — CYPROHEPTADINE HYDROCHLORIDE 4 MG/1
4 TABLET ORAL DAILY PRN
Qty: 30 TABLET | Refills: 11 | Status: SHIPPED | OUTPATIENT
Start: 2020-03-04

## 2020-03-04 RX ORDER — SIMVASTATIN 10 MG/1
10 TABLET, FILM COATED ORAL NIGHTLY
Qty: 90 TABLET | Refills: 3 | Status: SHIPPED | OUTPATIENT
Start: 2020-03-04

## 2020-03-04 RX ORDER — CARVEDILOL 3.12 MG/1
3.12 TABLET ORAL 2 TIMES DAILY WITH MEALS
Qty: 60 TABLET | Refills: 11 | Status: SHIPPED | OUTPATIENT
Start: 2020-03-04 | End: 2021-04-13

## 2020-03-04 NOTE — PROGRESS NOTES
"  Subjective:      Patient ID: Alec Teague is a 74 y.o. male.    Chief Complaint: Follow-up (c/o dry mouth, sleepy, lightheaded from Coreg)    HPI:  Pt states that he could not tolerate the 6.25 mg bid of the carvedilol so he went back to 3.125 mg bid    "I catch myself hyperventilating a bit at times"  Which is a chronic problem.    Pt can walk around [facility] [place]    Right hip hurts when walking--pt has to stop and rest and the pain goes away.    Pt works on his truck.    Pt does some  work.    Pt sweeps and mops.    Review of Systems   Cardiovascular: Positive for claudication (Chronic stable right hip pain when walking has not been a problem over the past year.). Negative for chest pain, dyspnea on exertion, irregular heartbeat, leg swelling, near-syncope, orthopnea, palpitations and syncope.      Pt states he completely quit consuming all alcohol in 1985.    Pt has taken cyproheptadine for decades for appetite and for allergic rhinitis    Past Medical History:   Diagnosis Date    Cardiomyopathy     attributed to alcohol 1980's    CHF (congestive heart failure)     Hyperlipidemia     Hypertension     Stroke     residual right hemiparesis. 2008        History reviewed. No pertinent surgical history.    Family History   Problem Relation Age of Onset    Pancreatic cancer Mother     Cirrhosis Brother     Asthma Brother        Social History     Socioeconomic History    Marital status:      Spouse name: Not on file    Number of children: Not on file    Years of education: Not on file    Highest education level: Not on file   Occupational History    Not on file   Social Needs    Financial resource strain: Not on file    Food insecurity:     Worry: Not on file     Inability: Not on file    Transportation needs:     Medical: Not on file     Non-medical: Not on file   Tobacco Use    Smoking status: Current Every Day Smoker     Types: Pipe    Smokeless tobacco: Never Used   Substance and " "Sexual Activity    Alcohol use: Not Currently    Drug use: Not on file    Sexual activity: Not on file   Lifestyle    Physical activity:     Days per week: Not on file     Minutes per session: Not on file    Stress: Not on file   Relationships    Social connections:     Talks on phone: Not on file     Gets together: Not on file     Attends Tenri[employer] service: Not on file     Active member of club or organization: Not on file     Attends meetings of clubs or organizations: Not on file     Relationship status: Not on file   Other Topics Concern    Not on file   Social History Narrative    Not on file       Current Outpatient Medications on File Prior to Visit   Medication Sig Dispense Refill    AMITIZA 8 mcg Cap TK ONE C PO QD  2    aspirin (ECOTRIN) 81 MG EC tablet Take 81 mg by mouth once daily.      docosahexanoic acid/epa (FISH OIL ORAL) Take by mouth once daily.      GENERLAC 10 gram/15 mL solution TK  5 ML PO  BID  2    lisinopril 10 MG tablet TK 1 T PO BID  0    [DISCONTINUED] carvedilol (COREG) 6.25 MG tablet Take 1 tablet (6.25 mg total) by mouth every 12 (twelve) hours. 60 tablet 11    FOLIC ACID ORAL Take by mouth once daily.      [DISCONTINUED] simvastatin (ZOCOR) 10 MG tablet TK 1 T PO QD  0     No current facility-administered medications on file prior to visit.        Review of patient's allergies indicates:   Allergen Reactions    Pcn [penicillins] Swelling     Objective:     Vitals:    03/04/20 1411   BP: 137/89   BP Location: Left arm   Patient Position: Sitting   BP Method: Large (Automatic)   Pulse: 106   Weight: 70.8 kg (156 lb)   Height: 5' 8" (1.727 m)        Physical Exam   Constitutional: He is oriented to person, place, and time. He appears well-developed and well-nourished. No distress.   Eyes: No scleral icterus.   Neck: No JVD present. Carotid bruit is not present.   Cardiovascular: Regular rhythm. Exam reveals no gallop and no friction rub.   Murmur " heard.  Pulmonary/Chest: Effort normal and breath sounds normal. No respiratory distress.   Musculoskeletal: He exhibits no edema.   Neurological: He is alert and oriented to person, place, and time.   Skin: Skin is warm and dry. He is not diaphoretic.   Psychiatric: He has a normal mood and affect. His behavior is normal. Judgment and thought content normal.   Vitals reviewed.     Wt down 6 lbs    ECG: sinus tachycardia, left anterior hemiblock, inverted T waves in lateral leads.    Note Cardiolite stress test 6/19 showed possible inferior scar, LVEF 17%, no ischemia    Note echocardiogram 11/19 showed LVEF 25% and severe MR    Assessment:     1. Non-ischemic cardiomyopathy    2. Essential hypertension    3. Alcoholic cardiomyopathy    4. Nonrheumatic mitral valve regurgitation    5. Nonspecific abnormal electrocardiogram (ECG) (EKG)    6. PAD (peripheral artery disease)    7. Pure hypercholesterolemia    8. Hemiparesis due to old stroke    9. Cardiomyopathy    10. Noncompliance    11. Sinus tachycardia      Plan:   Alec was seen today for follow-up.    Diagnoses and all orders for this visit:    Non-ischemic cardiomyopathy  -     IN OFFICE EKG 12-LEAD (to [facility])  -     CBC auto differential; Future  -     Comprehensive metabolic panel; Future  -     Lipid panel; Future  -     TSH; Future  -     Brain natriuretic peptide; Future  -     T4, free; Future    Essential hypertension    Alcoholic cardiomyopathy    Nonrheumatic mitral valve regurgitation    Nonspecific abnormal electrocardiogram (ECG) (EKG)    PAD (peripheral artery disease)    Pure hypercholesterolemia  -     CBC auto differential; Future  -     Comprehensive metabolic panel; Future  -     Lipid panel; Future  -     TSH; Future  -     Brain natriuretic peptide; Future  -     T4, free; Future    Hemiparesis due to old stroke    Cardiomyopathy  -     IN OFFICE EKG 12-LEAD (to [facility])    Noncompliance    Sinus tachycardia  -     CBC auto differential;  Future  -     Comprehensive metabolic panel; Future  -     Lipid panel; Future  -     TSH; Future  -     Brain natriuretic peptide; Future  -     T4, free; Future    Other orders  -     simvastatin (ZOCOR) 10 MG tablet; Take 1 tablet (10 mg total) by mouth every evening.  -     cyproheptadine (PERIACTIN) 4 mg tablet; Take 1 tablet (4 mg total) by mouth daily as needed.  -     carvediloL (COREG) 3.125 MG tablet; Take 1 tablet (3.125 mg total) by mouth 2 (two) times daily with meals.     Same meds     Check lab: CBC and CMP and  TFT's and BNP and lipid profile    Pt wishes to take 3.125 mg bid instead of the 6.25 mg bid    Follow up in about 6 months (around 9/4/2020).

## 2021-03-02 DIAGNOSIS — E03.9 HYPOTHYROIDISM, ADULT: Primary | ICD-10-CM

## 2021-03-11 ENCOUNTER — HOSPITAL ENCOUNTER (OUTPATIENT)
Dept: RADIOLOGY | Facility: HOSPITAL | Age: 76
Discharge: HOME | End: 2021-03-11
Attending: FAMILY MEDICINE
Payer: MEDICARE

## 2021-03-11 DIAGNOSIS — E03.9 HYPOTHYROIDISM, ADULT: ICD-10-CM

## 2021-03-11 PROCEDURE — A9500 TC99M SESTAMIBI: HCPCS

## 2021-03-11 PROCEDURE — 78072 PARATHYRD PLANAR W/SPECT&CT: CPT | Mod: 26,,, | Performed by: RADIOLOGY

## 2021-03-11 PROCEDURE — 78072 NM PARATHYROID SCAN WITH SPECT AND CT: ICD-10-PCS | Mod: 26,,, | Performed by: RADIOLOGY

## 2021-03-23 DIAGNOSIS — R91.8 LUNG MASS: Primary | ICD-10-CM

## 2021-05-05 DIAGNOSIS — R64 CACHEXIA: Primary | ICD-10-CM

## 2021-05-28 ENCOUNTER — TELEPHONE (OUTPATIENT)
Dept: INTERVENTIONAL RADIOLOGY/VASCULAR | Facility: CLINIC | Age: 76
End: 2021-05-28

## 2021-06-01 ENCOUNTER — TELEPHONE (OUTPATIENT)
Dept: INTERVENTIONAL RADIOLOGY/VASCULAR | Facility: CLINIC | Age: 76
End: 2021-06-01

## 2021-06-09 ENCOUNTER — TELEPHONE (OUTPATIENT)
Dept: INTERVENTIONAL RADIOLOGY/VASCULAR | Facility: CLINIC | Age: 76
End: 2021-06-09